# Patient Record
(demographics unavailable — no encounter records)

---

## 2025-05-27 NOTE — ASSESSMENT
[FreeTextEntry1] : Assessment/Plan:  #1 Right vocal fold lesion  #2 Eliquis #3 Hx of PE #4 COPD #5 Morbid obesity #6 DMII  I have recommended we proceed to the OR for direct laryngoscopy, biopsy, possible frozen, possible laser cordectomy.  The risks, benefits, and alternatives to care were discussed with the patient and understanding expressed.  Will need to be off of Eliquis for surgery.  Will need medical clearance for this.  He currently resides at [facility].

## 2025-05-27 NOTE — PHYSICAL EXAM
[Normal] : mucosa is normal [Midline] : trachea located in midline position [de-identified] : poor dentition

## 2025-05-27 NOTE — REASON FOR VISIT
Patient with history of CKDIV placed in observation for UTI after presenting with SOB and general malaise. She remained afebrile without leukocytosis overnight. CXR had findings suspicious for pulmonary vascular congestion - however, the patient did not appear clinically in FVO. 2D echo was obtained that showed a preserved LVEF 65% with mild concentric Left ventricular hypertrophy. Placed on IV antibiotics for UTI and administered one dose lasix IV 20 mg.    Serial troponins, BNP all remained normal.  She has CKD noted on chemistry which was consistent to her chronic CKD 4 in fact her creatinine improved to 2.0 which is lower than it has been over 5 months.  She will convert to oral Keflex at the time of discharge as urine culture shows presumptive E coli.  She originally refused VQ scan after DDimer in ED found to have 2.99. She is not a candidate for CTA. Her personal Dr/Friend, Dr. Lambert was at bedside and convinced the patient to undergo VQ scan. V/Q scan showed low probability for PE, her lower extremity ultrasound was negative.  Patient denies history of smoking, denies new chest pain, reports she is back to baseline.  Vital signs grossly stable oxygen sat 95%. Discharged to home in stable condition.  
[Initial Evaluation] : an initial evaluation for
[Initial Evaluation] : an initial evaluation for

## 2025-05-27 NOTE — PHYSICAL EXAM
[Normal] : mucosa is normal [Midline] : trachea located in midline position [de-identified] : poor dentition

## 2025-05-27 NOTE — PROCEDURE
[de-identified] : Stroboscopic Laryngoscopy Procedure Note:  Indication:	Assess laryngeal biomechanics and vocal fold oscillation.  Description of Procedure:	Informed consent was verbally obtained from the patient prior to the procedure. The patient was seated in the clinic chair. Topical anesthesia was achieved by first spraying the nasal cavities with 4% lidocaine and nasal decongestant.   Findings:  Supraglottis: no masses or lesions  Glottis:    Structure:                        Right: white polyp like lesion mid fold                       Left:  crisp and shows no lesions or masses                 Mobility:                        Right:  normal                        Left:  normal                Amplitude:                        Right:  normal                       Left:  normal                Closure: complete                 Wave symmetry:  symmetric  Subglottis: no masses or lesions within the visualized subglottis Visualized airway is widely patent.

## 2025-05-27 NOTE — ASSESSMENT
[FreeTextEntry1] : Assessment/Plan:  #1 Right vocal fold lesion  #2 Eliquis #3 Hx of PE #4 COPD #5 Morbid obesity #6 DMII  I have recommended we proceed to the OR for direct laryngoscopy, biopsy, possible frozen, possible laser cordectomy.  The risks, benefits, and alternatives to care were discussed with the patient and understanding expressed.  Will need to be off of Eliquis for surgery.  Will need medical clearance for this.  He currently resides at [facility] and [facility].

## 2025-05-27 NOTE — PROCEDURE
[de-identified] : Stroboscopic Laryngoscopy Procedure Note:  Indication:	Assess laryngeal biomechanics and vocal fold oscillation.  Description of Procedure:	Informed consent was verbally obtained from the patient prior to the procedure. The patient was seated in the clinic chair. Topical anesthesia was achieved by first spraying the nasal cavities with 4% lidocaine and nasal decongestant.   Findings:  Supraglottis: no masses or lesions  Glottis:    Structure:                        Right: white polyp like lesion mid fold                       Left:  crisp and shows no lesions or masses                 Mobility:                        Right:  normal                        Left:  normal                Amplitude:                        Right:  normal                       Left:  normal                Closure: complete                 Wave symmetry:  symmetric  Subglottis: no masses or lesions within the visualized subglottis Visualized airway is widely patent.

## 2025-05-27 NOTE — HISTORY OF PRESENT ILLNESS
[de-identified] : NIK NICHOLE is a 66 year old man who presents to the [facility] with difficulty phonating. Resides at [facility] facility. Had covid and pneumonia requiring hospitalization 3 years ago where he had multiple DVTs. Saw prior ENT (does not know name) and was told he has a "piece of meat" on his vocal fold.  This problem has been going on for - n/a They describe their voice as normal.  He now does have periods of normal voicing. He does not have difficulties with swallowing. He does have frequent classic heartburn symptoms - Tums PRN Smoking history: Smoked since his teens, 1 pack a day, quit 3 years ago.    VOCAL DEMANDS: His vocal demands are primarily those of general conversation.

## 2025-05-27 NOTE — HISTORY OF PRESENT ILLNESS
[de-identified] : NKI NICHOLE is a 66 year old man who presents to the [facility] with difficulty phonating. Resides at [facility] and [facility] facility. Had covid and pneumonia requiring hospitalization 3 years ago where he had multiple DVTs. Saw prior ENT (does not know name) and was told he has a "piece of meat" on his vocal fold.  This problem has been going on for - n/a They describe their voice as normal.  He now does have periods of normal voicing. He does not have difficulties with swallowing. He does have frequent classic heartburn symptoms - Tums PRN Smoking history: Smoked since his teens, 1 pack a day, quit 3 years ago.    VOCAL DEMANDS: His vocal demands are primarily those of general conversation.

## 2025-07-25 NOTE — HISTORY OF PRESENT ILLNESS
[de-identified] : NIK NICHOLE is a 66 year old man who presents to the [facility] with right vocal fold lesion, eliquis, hx of PE, COPD, morbid obesity, and DMII. Last seen 7/16/25 in OR. 1st POA. Voice has been hoarse, slowly improving. No difficulty swallowing or breathing.   Path 7/16/25: Larynx, right vocal fold lesion - Vocal cord polyp with overlying hyperparakeratosis  Previously reported: difficulty phonating. Resides at [facility]. Had covid and pneumonia requiring hospitalization 3 years ago where he had multiple DVTs. Saw prior ENT (does not know name) and was told he has a "piece of meat" on his vocal fold. This problem has been going on for - n/a They describe their voice as normal. He now does have periods of normal voicing. He does not have difficulties with swallowing. He does have frequent classic heartburn symptoms - Tums PRN Smoking history: Smoked since his teens, 1 pack a day, quit 3 years ago.  VOCAL DEMANDS: His vocal demands are primarily those of general conversation.  Prior Pertinent Procedures: 7/16/25: DL, excision right VF lesion, steroid inj; Dr. Villarreal

## 2025-07-25 NOTE — ASSESSMENT
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  Will determine if needs voice therapy at that time.

## 2025-07-25 NOTE — HISTORY OF PRESENT ILLNESS
[de-identified] : NIK NICHOLE is a 66 year old man who presents to the [facility] with right vocal fold lesion, eliquis, hx of PE, COPD, morbid obesity, and DMII. Last seen 7/16/25 in OR. 1st POA. Voice has been hoarse, slowly improving. No difficulty swallowing or breathing.   Path 7/16/25: Larynx, right vocal fold lesion - Vocal cord polyp with overlying hyperparakeratosis  Previously reported: difficulty phonating. Resides at [facility]. Had covid and pneumonia requiring hospitalization 3 years ago where he had multiple DVTs. Saw prior ENT (does not know name) and was told he has a "piece of meat" on his vocal fold. This problem has been going on for - n/a They describe their voice as normal. He now does have periods of normal voicing. He does not have difficulties with swallowing. He does have frequent classic heartburn symptoms - Tums PRN Smoking history: Smoked since his teens, 1 pack a day, quit 3 years ago.  VOCAL DEMANDS: His vocal demands are primarily those of general conversation.  Prior Pertinent Procedures: 7/16/25: DL, excision right VF lesion, steroid inj; Dr. Villarreal

## 2025-07-25 NOTE — PROCEDURE
[de-identified] : Stroboscopic Laryngoscopy Procedure Note:  Indication:	Assess laryngeal biomechanics and vocal fold oscillation.  Description of Procedure:	Informed consent was verbally obtained from the patient prior to the procedure. The patient was seated in the clinic chair. Topical anesthesia was achieved by first spraying the nasal cavities with 4% lidocaine and nasal decongestant.   Findings:  Supraglottis: no masses or lesions  Glottis:    Structure:                        Right: erythema throughout                       Left:  crisp and shows no lesions or masses                 Mobility:                        Right:  normal                        Left:  normal                Amplitude:                        Right:  mildly decreased                       Left:  normal                Closure: complete                 Wave symmetry:  asymmetric  Subglottis: no masses or lesions within the visualized subglottis Visualized airway is widely patent.

## 2025-07-25 NOTE — PROCEDURE
[de-identified] : Stroboscopic Laryngoscopy Procedure Note:  Indication:	Assess laryngeal biomechanics and vocal fold oscillation.  Description of Procedure:	Informed consent was verbally obtained from the patient prior to the procedure. The patient was seated in the clinic chair. Topical anesthesia was achieved by first spraying the nasal cavities with 4% lidocaine and nasal decongestant.   Findings:  Supraglottis: no masses or lesions  Glottis:    Structure:                        Right: erythema throughout                       Left:  crisp and shows no lesions or masses                 Mobility:                        Right:  normal                        Left:  normal                Amplitude:                        Right:  mildly decreased                       Left:  normal                Closure: complete                 Wave symmetry:  asymmetric  Subglottis: no masses or lesions within the visualized subglottis Visualized airway is widely patent.